# Patient Record
Sex: FEMALE
[De-identification: names, ages, dates, MRNs, and addresses within clinical notes are randomized per-mention and may not be internally consistent; named-entity substitution may affect disease eponyms.]

---

## 2021-10-01 ENCOUNTER — NON-APPOINTMENT (OUTPATIENT)
Age: 51
End: 2021-10-01

## 2021-10-01 DIAGNOSIS — E11.9 TYPE 2 DIABETES MELLITUS W/OUT COMPLICATIONS: ICD-10-CM

## 2021-10-01 PROBLEM — Z00.00 ENCOUNTER FOR PREVENTIVE HEALTH EXAMINATION: Status: ACTIVE | Noted: 2021-10-01

## 2021-10-01 RX ORDER — LANCETS 33 GAUGE
EACH MISCELLANEOUS
Qty: 100 | Refills: 1 | Status: ACTIVE | COMMUNITY
Start: 2021-10-01 | End: 1900-01-01

## 2021-11-01 ENCOUNTER — APPOINTMENT (OUTPATIENT)
Dept: ENDOCRINOLOGY | Facility: CLINIC | Age: 51
End: 2021-11-01
Payer: COMMERCIAL

## 2021-11-01 VITALS — BODY MASS INDEX: 26.29 KG/M2 | WEIGHT: 154 LBS | HEIGHT: 64 IN

## 2021-11-01 PROCEDURE — 99203 OFFICE O/P NEW LOW 30 MIN: CPT | Mod: 95

## 2021-11-01 RX ORDER — GALCANEZUMAB 120 MG/ML
120 INJECTION, SOLUTION SUBCUTANEOUS
Qty: 1 | Refills: 0 | Status: ACTIVE | COMMUNITY
Start: 2021-07-01

## 2021-11-01 RX ORDER — PROPRANOLOL HYDROCHLORIDE 40 MG/1
40 TABLET ORAL
Qty: 90 | Refills: 0 | Status: ACTIVE | COMMUNITY
Start: 2021-07-19

## 2021-11-01 RX ORDER — ROSUVASTATIN CALCIUM 5 MG/1
5 TABLET, FILM COATED ORAL
Qty: 90 | Refills: 0 | Status: ACTIVE | COMMUNITY
Start: 2021-08-12

## 2021-11-01 RX ORDER — VALSARTAN AND HYDROCHLOROTHIAZIDE 160; 12.5 MG/1; MG/1
160-12.5 TABLET, FILM COATED ORAL
Qty: 90 | Refills: 0 | Status: ACTIVE | COMMUNITY
Start: 2021-05-03

## 2021-11-01 RX ORDER — SUMATRIPTAN 100 MG/1
100 TABLET, FILM COATED ORAL
Qty: 9 | Refills: 0 | Status: ACTIVE | COMMUNITY
Start: 2021-03-15

## 2021-11-01 NOTE — REASON FOR VISIT
[Initial Evaluation] : an initial evaluation [DM Type 2] : DM Type 2 [Thyroid nodule/ MNG] : thyroid nodule/ MNG [Home] : at home, [unfilled] , at the time of the visit. [Medical Office: ([facility])___] : at the medical office located in  [Verbal consent obtained from patient] : the patient, [unfilled]

## 2021-11-01 NOTE — ASSESSMENT
[FreeTextEntry1] : Diabetes, suboptimal.   goal A1c < 7%\par Increase metformin ER to 750mg /day and if am glucose still over 150, titrate to 750mg BID.\par continue Trulicity, will keep same dose for now, but if weight loss reaches a plateau, or if sugars still suboptimal, will titrate Trulicity dose to 1.5mg/week, next visit.\par Recommended increasing walking steps goal to 8000/day, 5x/week\par she has [facility] form to check A1c and other labs\par continue statin therapy\par alana recommended\par RTO 3-4 months

## 2021-11-01 NOTE — HISTORY OF PRESENT ILLNESS
[FreeTextEntry1] : Diabetes diagnosed a few years ago and was on metformin only\par Last A1c in September went up to 7.9%.\par Trulicity added by me last month.  Lost a few lb after starting, but now has plateau'd.  Trulicity causing some reflux.\par Glucose at home 160-180s in the morning and 140-160s at bedtime.  Her morning sugars used to range 140-160.\par Does some walking but on surgical days, doesn't walk as much.  She thinks she makes 8000 steps/day, 2-3x/week.\par periods irregular, now every 3-6 months.  LMP in January 2021.  no menopause symptoms\par \par PMH: diabetes \par migraine HA. \par \par Meds:\par Trulicity 0.75mg/week\par metformin ER 500mg, one /day\par valsartan HCTZ 160/120.5\par rosuvastatin 5mg\par Emgality monthly, propranolol 40mg hs, sumatriptan prn\par Previous meds:  Topamax (paresthesias)

## 2021-11-23 ENCOUNTER — RX RENEWAL (OUTPATIENT)
Age: 51
End: 2021-11-23

## 2021-11-23 RX ORDER — METFORMIN ER 750 MG 750 MG/1
750 TABLET ORAL
Qty: 60 | Refills: 5 | Status: ACTIVE | COMMUNITY
Start: 2021-02-13 | End: 1900-01-01

## 2022-07-07 ENCOUNTER — RX RENEWAL (OUTPATIENT)
Age: 52
End: 2022-07-07

## 2022-07-07 RX ORDER — BLOOD SUGAR DIAGNOSTIC
STRIP MISCELLANEOUS DAILY
Qty: 100 | Refills: 1 | Status: ACTIVE | COMMUNITY
Start: 2021-10-01 | End: 1900-01-01

## 2022-09-21 RX ORDER — DULAGLUTIDE 0.75 MG/.5ML
0.75 INJECTION, SOLUTION SUBCUTANEOUS
Qty: 4 | Refills: 5 | Status: DISCONTINUED | COMMUNITY
Start: 2021-10-01 | End: 2022-09-21

## 2022-09-21 RX ORDER — SEMAGLUTIDE 1.34 MG/ML
2 INJECTION, SOLUTION SUBCUTANEOUS
Qty: 1 | Refills: 3 | Status: ACTIVE | COMMUNITY
Start: 2022-09-21 | End: 1900-01-01

## 2022-09-30 ENCOUNTER — APPOINTMENT (OUTPATIENT)
Dept: ENDOCRINOLOGY | Facility: CLINIC | Age: 52
End: 2022-09-30

## 2022-09-30 VITALS — WEIGHT: 150 LBS | BODY MASS INDEX: 25.75 KG/M2

## 2022-09-30 DIAGNOSIS — E04.2 NONTOXIC MULTINODULAR GOITER: ICD-10-CM

## 2022-09-30 PROCEDURE — 99214 OFFICE O/P EST MOD 30 MIN: CPT | Mod: 95

## 2022-09-30 NOTE — DATA REVIEWED
[FreeTextEntry1] : 9/22 A1c 7.0%, tot chol 122, HDL 54, trig 188, LDL 42, TSH 1.41, B12 316\par 4/22 A1c 6.9%, tot chol 143, HDL 57, trig 161, LDL 62, TSH 0.83, B12 511\par 12/21 A1c 6.3%, tot chol 119, HDL 45, trig 175, LDL 49, urine alb/cr 11, TSH 1.23\par 9/21: A1c 7.9%, tot chol 139, HDL 50, trig 237, LDL 59, TSH 0.89, 25D 24\par 12/20: A1c 7.0%, tot chol 132, HDL 56, trig 149, LDL 53, TSH 0.78, 25D 14, B12 489

## 2022-09-30 NOTE — REASON FOR VISIT
[Home] : at home, [unfilled] , at the time of the visit. [Medical Office: ([facility])___] : at the medical office located in  [Follow - Up] : a follow-up visit [DM Type 2] : DM Type 2

## 2022-09-30 NOTE — HISTORY OF PRESENT ILLNESS
[FreeTextEntry1] : \par Reduced rice in diet and trying to be better, walking more.  But realistically, doesn't know how much more she will improve on diet.\par Went to [place] this summer and was walking a lot more than when [place] is in [place]\par Had to stop Trulicity due to GI intolerance -- too much acid and reflux\par Now having frequent BMs, 3-4x/day, taking metformin and probiotic\par LMP over a year ago.  no hot flashes or night sweats\par labs reviewed from [employer], 9/22: A1c 7.0%.  UA suggestive of UTI\par she is not having dysuria symptoms now but gets some symptoms if she is working all day and not drinking, not urinating\par \par PMH: diabetes \par migraine HA. \par h/o urinary retention s/p urinary dilatation\par \par Meds:\par Trulicity 0.75mg/week\par metformin ER 500mg, one /day\par valsartan HCTZ 160/120.5\par rosuvastatin 5mg\par Emgality monthly, propranolol 40mg hs, sumatriptan prn\par Previous meds:  Topamax (paresthesias)

## 2022-09-30 NOTE — ASSESSMENT
[FreeTextEntry1] : Diabetes, A1c right at goal.  goal A1c < 7%\par continue metformin, will add Ozempic.  Start Ozempic 0.25mg/week for 4 weeks and then titrate to 0.5mg/week, as tolerated.  Advised to eat slowly to prevent potential nausea and GERD side effects.  \par continue statin therapy\par ophtho recommended (she will have colleague check for her)\par I recommended taking B12 supplement, OTC, once a week.\par Suggested getting repeat UA to make sure she doesn't have UTI.  will avoid SGLT2 inhibitors for her.\par \par Thyroid nodules\par will send for repeat sono.  I remember the nodules being small, but it's been a while since we checked.\par RTO 6 months

## 2023-02-03 NOTE — DATA REVIEWED
[FreeTextEntry1] : 9/21: A1c 7.9%, tot chol 139, HDL 50, trig 237, LDL 59, TSH 0.89, 25D 24\par 12/20: A1c 7.0%, tot chol 132, HDL 56, trig 149, LDL 53, TSH 0.78, 25D 14, B12 489 Medical Orders for Life-Sustaining [facility] ([facility])

## 2023-08-11 ENCOUNTER — OUTPATIENT (OUTPATIENT)
Dept: OUTPATIENT SERVICES | Facility: HOSPITAL | Age: 53
LOS: 1 days | End: 2023-08-11

## 2023-08-11 ENCOUNTER — APPOINTMENT (OUTPATIENT)
Dept: MRI IMAGING | Facility: CLINIC | Age: 53
End: 2023-08-11
Payer: COMMERCIAL

## 2023-08-11 PROCEDURE — 72197 MRI PELVIS W/O & W/DYE: CPT | Mod: 26
